# Patient Record
Sex: FEMALE | Race: WHITE | HISPANIC OR LATINO
[De-identification: names, ages, dates, MRNs, and addresses within clinical notes are randomized per-mention and may not be internally consistent; named-entity substitution may affect disease eponyms.]

---

## 2022-04-06 PROBLEM — Z00.00 ENCOUNTER FOR PREVENTIVE HEALTH EXAMINATION: Status: ACTIVE | Noted: 2022-04-06

## 2022-04-19 ENCOUNTER — APPOINTMENT (OUTPATIENT)
Dept: ORTHOPEDIC SURGERY | Facility: CLINIC | Age: 47
End: 2022-04-19
Payer: OTHER MISCELLANEOUS

## 2022-04-19 VITALS — BODY MASS INDEX: 33.99 KG/M2 | HEIGHT: 61 IN | WEIGHT: 180 LBS

## 2022-04-19 DIAGNOSIS — Z78.9 OTHER SPECIFIED HEALTH STATUS: ICD-10-CM

## 2022-04-19 DIAGNOSIS — M79.645 PAIN IN LEFT FINGER(S): ICD-10-CM

## 2022-04-19 PROCEDURE — 99072 ADDL SUPL MATRL&STAF TM PHE: CPT

## 2022-04-19 PROCEDURE — 99214 OFFICE O/P EST MOD 30 MIN: CPT

## 2022-04-21 PROBLEM — M79.645 PAIN OF LEFT THUMB: Status: ACTIVE | Noted: 2022-04-19

## 2022-04-21 NOTE — IMAGING
[de-identified] : Left thumb with mild swelling, skin intact. +ttp at MCP, unable to stress. Able to flex and extend at IP. Sensation intact throughout. <2sec cap refill.\par \par Left thumb radiographs without fracture nor dislocation. \par Left thumb MRI without fracture nor dislocation. Partial MCP UCL tear.

## 2022-04-21 NOTE — HISTORY OF PRESENT ILLNESS
[de-identified] : 57F, RHD, No PMHX presents with left thumb injury from 3/18/22. Patient reports she was at work and moving something heavy and her thumb was crushed between the object and the wall. Reports having radiographs done at [facility], reports unremarkable. Admits to pain, admits to numbness/tingling. Difficulty with full flexion, reports swelling and bruising. \par \par Patient works for a pharmacy.\par \par 4/19/22: f/u left thumb with MRI results [facility]. states feels less pain since last visit

## 2022-04-21 NOTE — ASSESSMENT
[FreeTextEntry1] : Left thumb sprain concern for ligament rupture - reviewed MRI which demonstrates partial UCL tear. Continue thumb spica for 2 weeks, NSAIDs, NWB. OT.\par \par Left thumb with full disability - good prognosis.\par \par F/u 4 weeks

## 2022-05-31 ENCOUNTER — APPOINTMENT (OUTPATIENT)
Dept: ORTHOPEDIC SURGERY | Facility: CLINIC | Age: 47
End: 2022-05-31